# Patient Record
Sex: FEMALE | Race: BLACK OR AFRICAN AMERICAN | NOT HISPANIC OR LATINO | Employment: FULL TIME | URBAN - METROPOLITAN AREA
[De-identification: names, ages, dates, MRNs, and addresses within clinical notes are randomized per-mention and may not be internally consistent; named-entity substitution may affect disease eponyms.]

---

## 2023-03-12 ENCOUNTER — HOSPITAL ENCOUNTER (EMERGENCY)
Facility: HOSPITAL | Age: 18
Discharge: HOME | End: 2023-03-12
Attending: FAMILY MEDICINE
Payer: COMMERCIAL

## 2023-03-12 VITALS
HEART RATE: 77 BPM | DIASTOLIC BLOOD PRESSURE: 100 MMHG | OXYGEN SATURATION: 97 % | BODY MASS INDEX: 48.82 KG/M2 | SYSTOLIC BLOOD PRESSURE: 147 MMHG | TEMPERATURE: 98 F | HEIGHT: 65 IN | RESPIRATION RATE: 18 BRPM | WEIGHT: 293 LBS

## 2023-03-12 DIAGNOSIS — S00.11XA CONTUSION OF RIGHT EYEBROW, INITIAL ENCOUNTER: Primary | ICD-10-CM

## 2023-03-12 LAB
B-HCG UR QL: NEGATIVE
CTP QC/QA: YES

## 2023-03-12 PROCEDURE — 99283 EMERGENCY DEPT VISIT LOW MDM: CPT

## 2023-03-12 PROCEDURE — 81025 URINE PREGNANCY TEST: CPT | Performed by: NURSE PRACTITIONER

## 2023-03-12 RX ORDER — DICLOFENAC SODIUM 75 MG/1
75 TABLET, DELAYED RELEASE ORAL 2 TIMES DAILY
Qty: 14 TABLET | Refills: 0 | Status: SHIPPED | OUTPATIENT
Start: 2023-03-12 | End: 2023-03-19

## 2023-03-12 RX ORDER — TETRACAINE HYDROCHLORIDE 5 MG/ML
1 SOLUTION OPHTHALMIC
Status: DISCONTINUED | OUTPATIENT
Start: 2023-03-12 | End: 2023-03-12

## 2023-03-12 NOTE — ED PROVIDER NOTES
Encounter Date: 3/12/2023       History     Chief Complaint   Patient presents with    Eye Injury     Pt hit above and below R eye with van lift at work at [employer], bruising noted to area, no LOC     Pt is a 18 y.o. female who presents to the [facility] ED for evaluation after being struck on the Rt eyebrow with the edge of Van lift. Reports operating the lift while at work and status it fell, striking her face. Denies pain to Rt eye, change in vision to Rt eye, LOC, dizziness, chest pain, SOB, abdominal pain, or loss of bowel or bladder control.    Review of patient's allergies indicates:  No Known Allergies  History reviewed. No pertinent past medical history.  History reviewed. No pertinent surgical history.  History reviewed. No pertinent family history.  Social History     Tobacco Use    Smoking status: Never    Smokeless tobacco: Never   Substance Use Topics    Alcohol use: Never    Drug use: Never     Review of Systems   Constitutional:  Negative for chills, diaphoresis, fatigue and fever.   HENT:  Positive for facial swelling. Negative for rhinorrhea, sinus pressure, sinus pain, sore throat and trouble swallowing.    Eyes:  Negative for photophobia, pain, discharge and visual disturbance.   Respiratory:  Negative for cough, chest tightness, shortness of breath and wheezing.    Cardiovascular:  Negative for chest pain, palpitations and leg swelling.   Gastrointestinal:  Negative for abdominal pain, diarrhea, nausea and vomiting.   Genitourinary:  Negative for dysuria, flank pain, frequency, hematuria and urgency.   Musculoskeletal:  Negative for arthralgias, back pain, joint swelling and myalgias.   Skin:  Negative for color change and rash.   Neurological:  Negative for dizziness, syncope, weakness and light-headedness.   Hematological:  Does not bruise/bleed easily.   All other systems reviewed and are negative.    Physical Exam     Initial Vitals [03/12/23 1712]   BP Pulse Resp Temp SpO2   (!)  147/100 77 18 98.4 °F (36.9 °C) 97 %      MAP       --         Physical Exam    Nursing note and vitals reviewed.  Constitutional: She appears well-developed and well-nourished.   HENT:   Head: Normocephalic. Head is with contusion. Head is without raccoon's eyes and without Rowe's sign.       Nose: Nose normal.   Mouth/Throat: Oropharynx is clear and moist.   Eyes: Conjunctivae, EOM and lids are normal. Pupils are equal, round, and reactive to light.   Neck: Neck supple.   Normal range of motion.  Cardiovascular:  Normal rate, regular rhythm, normal heart sounds and intact distal pulses.           Pulmonary/Chest: Effort normal and breath sounds normal. No respiratory distress. She has no wheezes. She has no rhonchi. She has no rales. She exhibits no tenderness.   Abdominal: Abdomen is soft and flat. Bowel sounds are normal. She exhibits no distension. There is no abdominal tenderness. There is no rebound, no guarding, no tenderness at McBurney's point and negative Mcelroy's sign. negative psoas sign  Musculoskeletal:         General: Normal range of motion.      Cervical back: Normal range of motion and neck supple.     Neurological: She is alert and oriented to person, place, and time. She has normal strength and normal reflexes.   Skin: Skin is warm and dry. Capillary refill takes less than 2 seconds.   Psychiatric: She has a normal mood and affect. Her speech is normal and behavior is normal. Judgment and thought content normal.       ED Course   Procedures  Labs Reviewed   POCT URINE PREGNANCY          Imaging Results    None          Medications - No data to display  Medical Decision Making:   Differential Diagnosis:   Facial contusion  Clinical Tests:   Lab Tests: Ordered and Reviewed  ED Management:  5:47 PM Reassessed patient at this time. Reports condition has improved. Discussed with patient all pertinent ED information and results. Discussed diagnosis and treatment plan with patient. Follow up  instructions and return to ED instruction have been given. All questions and concerns were addressed at this time. Patient voices understanding of information and instructions. Patient is comfortable with plan and discharge. Patient is stable for discharge.                           Clinical Impression:   Final diagnoses:  [S00.11XA] Contusion of right eyebrow, initial encounter (Primary)        ED Disposition Condition    Discharge Stable          ED Prescriptions       Medication Sig Dispense Start Date End Date Auth. Provider    diclofenac (VOLTAREN) 75 MG EC tablet Take 1 tablet (75 mg total) by mouth 2 (two) times daily. for 7 days 14 tablet 3/12/2023 3/19/2023 Aman Sanchez Jr., MONIKA          Follow-up Information    None          Aman Sanchez Jr., MONIKA  03/12/23 8160

## 2023-03-12 NOTE — DISCHARGE INSTRUCTIONS
Head injury precautions for next 12 hours. No alcohol, no NSAIDs, tylenol only for pain in that time frame.  Follow up with your PCP in next 5-7 days for evaluation.   Return to the [facility] ED immediately for change in mentation, bleeding from ears/nose, or loss of bowel or bladder control.   Take medication as prescribed.

## 2023-03-12 NOTE — Clinical Note
"Amelia "BillOmara" Filiberto was seen and treated in our emergency department on 3/12/2023.  She may return to work on 03/15/2023.       If you have any questions or concerns, please don't hesitate to call.      Aman Sanchez Jr., FNP"

## 2024-05-01 ENCOUNTER — HOSPITAL ENCOUNTER (EMERGENCY)
Facility: HOSPITAL | Age: 19
Discharge: HOME | End: 2024-05-01
Attending: INTERNAL MEDICINE

## 2024-05-01 VITALS
WEIGHT: 292.69 LBS | OXYGEN SATURATION: 100 % | HEIGHT: 64 IN | BODY MASS INDEX: 49.97 KG/M2 | TEMPERATURE: 98 F | DIASTOLIC BLOOD PRESSURE: 86 MMHG | RESPIRATION RATE: 18 BRPM | HEART RATE: 58 BPM | SYSTOLIC BLOOD PRESSURE: 135 MMHG

## 2024-05-01 DIAGNOSIS — N20.1 URETEROLITHIASIS: Primary | ICD-10-CM

## 2024-05-01 DIAGNOSIS — N12 PYELONEPHRITIS: ICD-10-CM

## 2024-05-01 LAB
ALBUMIN SERPL-MCNC: 3.8 G/DL (ref 3.5–5)
ALBUMIN/GLOB SERPL: 0.9 RATIO (ref 1.1–2)
ALP SERPL-CCNC: 92 UNIT/L (ref 40–150)
ALT SERPL-CCNC: 15 UNIT/L (ref 0–55)
APPEARANCE UR: ABNORMAL
AST SERPL-CCNC: 17 UNIT/L (ref 5–34)
B-HCG UR QL: NEGATIVE
BACTERIA #/AREA URNS AUTO: ABNORMAL /HPF
BASOPHILS # BLD AUTO: 0.05 X10(3)/MCL
BASOPHILS NFR BLD AUTO: 0.5 %
BILIRUB SERPL-MCNC: 0.3 MG/DL
BILIRUB UR QL STRIP.AUTO: NEGATIVE
BUN SERPL-MCNC: 15.3 MG/DL (ref 7–18.7)
CALCIUM SERPL-MCNC: 9.7 MG/DL (ref 8.4–10.2)
CHLORIDE SERPL-SCNC: 108 MMOL/L (ref 98–107)
CO2 SERPL-SCNC: 22 MMOL/L (ref 22–29)
COLOR UR AUTO: YELLOW
CREAT SERPL-MCNC: 0.93 MG/DL (ref 0.55–1.02)
CTP QC/QA: YES
EOSINOPHIL # BLD AUTO: 0.1 X10(3)/MCL (ref 0–0.9)
EOSINOPHIL NFR BLD AUTO: 1 %
ERYTHROCYTE [DISTWIDTH] IN BLOOD BY AUTOMATED COUNT: 13.8 % (ref 11.5–17)
GFR SERPLBLD CREATININE-BSD FMLA CKD-EPI: >60 MLS/MIN/1.73/M2
GLOBULIN SER-MCNC: 4.1 GM/DL (ref 2.4–3.5)
GLUCOSE SERPL-MCNC: 83 MG/DL (ref 74–100)
GLUCOSE UR QL STRIP.AUTO: NORMAL
HCT VFR BLD AUTO: 40.4 % (ref 37–47)
HGB BLD-MCNC: 12.8 G/DL (ref 12–16)
HOLD SPECIMEN: NORMAL
HYALINE CASTS #/AREA URNS LPF: ABNORMAL /LPF
IMM GRANULOCYTES # BLD AUTO: 0.03 X10(3)/MCL (ref 0–0.04)
IMM GRANULOCYTES NFR BLD AUTO: 0.3 %
KETONES UR QL STRIP.AUTO: NEGATIVE
LEUKOCYTE ESTERASE UR QL STRIP.AUTO: 75
LYMPHOCYTES # BLD AUTO: 2.71 X10(3)/MCL (ref 0.6–4.6)
LYMPHOCYTES NFR BLD AUTO: 27.9 %
MCH RBC QN AUTO: 26.2 PG (ref 27–31)
MCHC RBC AUTO-ENTMCNC: 31.7 G/DL (ref 33–36)
MCV RBC AUTO: 82.8 FL (ref 80–94)
MONOCYTES # BLD AUTO: 0.45 X10(3)/MCL (ref 0.1–1.3)
MONOCYTES NFR BLD AUTO: 4.6 %
MUCOUS THREADS URNS QL MICRO: ABNORMAL /LPF
NEUTROPHILS # BLD AUTO: 6.36 X10(3)/MCL (ref 2.1–9.2)
NEUTROPHILS NFR BLD AUTO: 65.7 %
NITRITE UR QL STRIP.AUTO: NEGATIVE
NRBC BLD AUTO-RTO: 0 %
PH UR STRIP.AUTO: 6.5 [PH]
PLATELET # BLD AUTO: 414 X10(3)/MCL (ref 130–400)
PMV BLD AUTO: 11.8 FL (ref 7.4–10.4)
POTASSIUM SERPL-SCNC: 4.3 MMOL/L (ref 3.5–5.1)
PROT SERPL-MCNC: 7.9 GM/DL (ref 6.4–8.3)
PROT UR QL STRIP.AUTO: ABNORMAL
RBC # BLD AUTO: 4.88 X10(6)/MCL (ref 4.2–5.4)
RBC #/AREA URNS AUTO: >100 /HPF
RBC UR QL AUTO: ABNORMAL
SODIUM SERPL-SCNC: 140 MMOL/L (ref 136–145)
SP GR UR STRIP.AUTO: 1.02 (ref 1–1.03)
SQUAMOUS #/AREA URNS LPF: ABNORMAL /HPF
UROBILINOGEN UR STRIP-ACNC: NORMAL
WBC # SPEC AUTO: 9.7 X10(3)/MCL (ref 4.5–11.5)
WBC #/AREA URNS AUTO: ABNORMAL /HPF

## 2024-05-01 PROCEDURE — 25000003 PHARM REV CODE 250: Performed by: PHYSICIAN ASSISTANT

## 2024-05-01 PROCEDURE — 96376 TX/PRO/DX INJ SAME DRUG ADON: CPT

## 2024-05-01 PROCEDURE — 81025 URINE PREGNANCY TEST: CPT | Performed by: PHYSICIAN ASSISTANT

## 2024-05-01 PROCEDURE — 96374 THER/PROPH/DIAG INJ IV PUSH: CPT

## 2024-05-01 PROCEDURE — 81001 URINALYSIS AUTO W/SCOPE: CPT | Performed by: PHYSICIAN ASSISTANT

## 2024-05-01 PROCEDURE — 63600175 PHARM REV CODE 636 W HCPCS: Performed by: PHYSICIAN ASSISTANT

## 2024-05-01 PROCEDURE — 87086 URINE CULTURE/COLONY COUNT: CPT | Performed by: PHYSICIAN ASSISTANT

## 2024-05-01 PROCEDURE — 80053 COMPREHEN METABOLIC PANEL: CPT | Performed by: PHYSICIAN ASSISTANT

## 2024-05-01 PROCEDURE — 85025 COMPLETE CBC W/AUTO DIFF WBC: CPT | Performed by: PHYSICIAN ASSISTANT

## 2024-05-01 PROCEDURE — 99285 EMERGENCY DEPT VISIT HI MDM: CPT | Mod: 25

## 2024-05-01 PROCEDURE — 96361 HYDRATE IV INFUSION ADD-ON: CPT

## 2024-05-01 RX ORDER — KETOROLAC TROMETHAMINE 10 MG/1
10 TABLET, FILM COATED ORAL EVERY 6 HOURS PRN
Qty: 20 TABLET | Refills: 0 | Status: SHIPPED | OUTPATIENT
Start: 2024-05-01 | End: 2024-05-06

## 2024-05-01 RX ORDER — CEFUROXIME AXETIL 500 MG/1
500 TABLET ORAL EVERY 12 HOURS
Qty: 14 TABLET | Refills: 0 | Status: SHIPPED | OUTPATIENT
Start: 2024-05-01 | End: 2024-05-08

## 2024-05-01 RX ORDER — KETOROLAC TROMETHAMINE 30 MG/ML
15 INJECTION, SOLUTION INTRAMUSCULAR; INTRAVENOUS
Status: COMPLETED | OUTPATIENT
Start: 2024-05-01 | End: 2024-05-01

## 2024-05-01 RX ORDER — TAMSULOSIN HYDROCHLORIDE 0.4 MG/1
0.4 CAPSULE ORAL
Status: COMPLETED | OUTPATIENT
Start: 2024-05-01 | End: 2024-05-01

## 2024-05-01 RX ORDER — ONDANSETRON 4 MG/1
4 TABLET, ORALLY DISINTEGRATING ORAL EVERY 8 HOURS PRN
Qty: 20 TABLET | Refills: 0 | Status: SHIPPED | OUTPATIENT
Start: 2024-05-01

## 2024-05-01 RX ORDER — TAMSULOSIN HYDROCHLORIDE 0.4 MG/1
0.4 CAPSULE ORAL DAILY
Qty: 14 CAPSULE | Refills: 0 | Status: SHIPPED | OUTPATIENT
Start: 2024-05-01 | End: 2024-05-15

## 2024-05-01 RX ADMIN — KETOROLAC TROMETHAMINE 15 MG: 30 INJECTION, SOLUTION INTRAMUSCULAR; INTRAVENOUS at 02:05

## 2024-05-01 RX ADMIN — SODIUM CHLORIDE 1000 ML: 9 INJECTION, SOLUTION INTRAVENOUS at 12:05

## 2024-05-01 RX ADMIN — KETOROLAC TROMETHAMINE 15 MG: 30 INJECTION, SOLUTION INTRAMUSCULAR at 12:05

## 2024-05-01 RX ADMIN — TAMSULOSIN HYDROCHLORIDE 0.4 MG: 0.4 CAPSULE ORAL at 02:05

## 2024-05-01 NOTE — ED PROVIDER NOTES
Encounter Date: 5/1/2024       History     Chief Complaint   Patient presents with    Abdominal Pain     Pt c/o jed lower abdominal/pelvic/vaginal pain x 3 days.       Bill De Los Santos is a 19 y.o. female with  no known medical problems who presents to the ED complaining of right flank pain x 3 days. Reports today pain started to radiate to her right groin/pelvic region. This morning she felt nauseated and vomited twice. Also noticed hematuria while at work today. She denies fevers, chills, chest pain, SOB, dysuria, vaginal discharge. Mother has history of kidney stones.     The history is provided by the patient and a parent.     Review of patient's allergies indicates:  No Known Allergies  No past medical history on file.  No past surgical history on file.  No family history on file.  Social History     Tobacco Use    Smoking status: Never    Smokeless tobacco: Never   Substance Use Topics    Alcohol use: Never    Drug use: Never     Review of Systems   Constitutional:  Negative for chills and fever.   HENT:  Negative for congestion and sore throat.    Respiratory:  Negative for cough and shortness of breath.    Cardiovascular:  Negative for chest pain and leg swelling.   Gastrointestinal:  Positive for abdominal pain. Negative for nausea.   Genitourinary:  Positive for flank pain and hematuria. Negative for dysuria.   Musculoskeletal:  Negative for arthralgias and back pain.   Skin:  Negative for rash and wound.   Neurological:  Negative for dizziness and weakness.   Hematological:  Does not bruise/bleed easily.       Physical Exam     Initial Vitals [05/01/24 1107]   BP Pulse Resp Temp SpO2   (!) 135/96 63 18 98.2 °F (36.8 °C) 99 %      MAP       --         Physical Exam    Nursing note and vitals reviewed.  Constitutional: She appears well-developed and well-nourished. No distress.   HENT:   Head: Normocephalic and atraumatic.   Mouth/Throat: No oropharyngeal exudate.   Eyes: EOM are normal. No scleral  icterus.   Neck: Neck supple.   Normal range of motion.  Cardiovascular:  Normal rate and regular rhythm.           No murmur heard.  Pulmonary/Chest: Breath sounds normal. No respiratory distress. She has no wheezes.   Abdominal: Abdomen is soft. Bowel sounds are normal. She exhibits no distension. There is no abdominal tenderness.   There is right CVA tenderness.  No left CVA tenderness. There is no rebound and no guarding.   Musculoskeletal:         General: No tenderness. Normal range of motion.      Cervical back: Normal range of motion and neck supple.     Neurological: She is alert and oriented to person, place, and time. No cranial nerve deficit.   Skin: Skin is warm and dry. Capillary refill takes less than 2 seconds. No erythema.   Psychiatric: She has a normal mood and affect. Her behavior is normal. Judgment and thought content normal.         ED Course   Procedures  Labs Reviewed   URINALYSIS, REFLEX TO URINE CULTURE - Abnormal; Notable for the following components:       Result Value    Appearance, UA Turbid (*)     Protein, UA Trace (*)     Blood, UA 3+ (*)     Leukocyte Esterase, UA 75 (*)     WBC, UA 21-50 (*)     Mucous, UA Trace (*)     RBC, UA >100 (*)     All other components within normal limits   COMPREHENSIVE METABOLIC PANEL - Abnormal; Notable for the following components:    Chloride 108 (*)     Globulin 4.1 (*)     Albumin/Globulin Ratio 0.9 (*)     All other components within normal limits   CBC WITH DIFFERENTIAL - Abnormal; Notable for the following components:    MCH 26.2 (*)     MCHC 31.7 (*)     Platelet 414 (*)     MPV 11.8 (*)     All other components within normal limits   CULTURE, URINE   CBC W/ AUTO DIFFERENTIAL    Narrative:     The following orders were created for panel order CBC auto differential.  Procedure                               Abnormality         Status                     ---------                               -----------         ------                     CBC  with Differential[[id number]]       Abnormal            Final result                 Please view results for these tests on the individual orders.   EXTRA TUBES    Narrative:     The following orders were created for panel order EXTRA TUBES.  Procedure                               Abnormality         Status                     ---------                               -----------         ------                     Light Blue Top Hold[[id number]]                             Final result               Red Top Hold[[id number]]                                    Final result               Gold Top Hold[[id number]]                                   Final result                 Please view results for these tests on the individual orders.   LIGHT BLUE TOP HOLD   RED TOP HOLD   GOLD TOP HOLD   POCT URINE PREGNANCY          Imaging Results              CT Abdomen Pelvis  Without Contrast (Final result)  Result time 05/01/24 14:10:26      Final result by Wil Armstrong MD (05/01/24 14:10:26)                   Impression:      1. Right distal ureteral calculus causing mild hydroureteronephrosis.    2. Bilateral kidneys multiple non occluding calculi.      Electronically signed by: Wil Armstrong  Date:    05/01/2024  Time:    14:10               Narrative:    EXAMINATION:  CT ABDOMEN PELVIS WITHOUT CONTRAST    CLINICAL HISTORY:  Flank pain, kidney stone suspected;    TECHNIQUE:  Multidetector axial images were obtained from the  diaphragms to below symphysis pubis without the administration of IV contrast. Oral contrast was not administered.    Dose length product of 1584 mGycm. Automated exposure control was utilized to minimize radiation dose.    COMPARISON:  None available.    FINDINGS:  Included lungs are without suspicious nodularity, acute air space infiltrates or fluid within the pleural spaces.    Within limitations of noncontrast technique, no acute findings of the liver, pancreas and spleen identified. Gallbladder  wall is not thickened and there is no intraluminal calcified calculus. No apparent biliary dilation.    The adrenal glands noncontrast evaluation is unremarkable. The kidneys are unremarkable in size and contour.  There is mild dilatation of the right kidney collecting system and the ureter which is caused by distal ureteral 4.5 mm calculus which is seen on image 144 series 5.  This calculus is approximately 9 cm proximal from the ureterovesical junction.  Bilateral kidneys are remarkable for multiple non occluding calculi.  No acute left obstructive uropathy.  There are no significant perinephric standings..    The stomach is partially decompressed.  Small bowel is normal in caliber. There is no free air or free fluid. The appendix is not dilated. Pericolonic fat is preserved without acute inflammatory strandings. There is no evidence for bowel obstruction.    Urinary bladder appears within normal limits. No intravesical stone identified. There is no pelvic free fluid.  There are bilateral inguinal few nonspecific mildly enlarged lymph nodes.                                       Medications   tamsulosin 24 hr capsule 0.4 mg (has no administration in time range)   ketorolac injection 15 mg (has no administration in time range)   sodium chloride 0.9% bolus 1,000 mL 1,000 mL (0 mLs Intravenous Stopped 5/1/24 1332)   ketorolac injection 15 mg (15 mg Intravenous Given 5/1/24 1206)     Medical Decision Making  Differential: pyelonephritis, nephrolithiasis, ureterolithiasis, among others    ED management: HDS. +R CVA tenderness. UA with 75 leukocytes, 21-50 WBC. No leukocytosis, afebrile. Cr WNL. CT abdomen/pelvis with 4.5 mm stone in distal ureter causing mild hydro, non-obstructing. Pt symptoms improved since receiving toradol and IVF. She feels comfortable being discharged. Discussed with Dr. Campbell who is in agreement. Will treat with flomax x 2 weeks, cefuroxime BID x 7 days. Toradol and zofran prn. Instructed patient  to strain her urine. Will refer to urology for further evaluation. Strict ED return precautions discussed with both the patient and her mother. They verbalized understanding. All questions answered.     Amount and/or Complexity of Data Reviewed  Labs: ordered. Decision-making details documented in ED Course.  Radiology: ordered. Decision-making details documented in ED Course.    Risk  Prescription drug management.               ED Course as of 05/01/24 1429   Wed May 01, 2024   1149 RBC, UA(!): >100 [KD]   1336 Delay in CT [KD]   1415 CT Abdomen Pelvis  Without Contrast  1. Right distal ureteral calculus causing mild hydroureteronephrosis.     2. Bilateral kidneys multiple non occluding calculi. [KD]      ED Course User Index  [KD] Archana Gaffney PA-C                             Clinical Impression:  Final diagnoses:  [N20.1] Ureterolithiasis (Primary)  [N12] Pyelonephritis          ED Disposition Condition    Discharge Stable          ED Prescriptions       Medication Sig Dispense Start Date End Date Auth. Provider    tamsulosin (FLOMAX) 0.4 mg Cap Take 1 capsule (0.4 mg total) by mouth once daily. for 14 days 14 capsule 5/1/2024 5/15/2024 Archana Gaffney PA-C    cefUROXime (CEFTIN) 500 MG tablet Take 1 tablet (500 mg total) by mouth every 12 (twelve) hours. for 7 days 14 tablet 5/1/2024 5/8/2024 Archana Gaffney PA-C    ketorolac (TORADOL) 10 mg tablet Take 1 tablet (10 mg total) by mouth every 6 (six) hours as needed for Pain. 20 tablet 5/1/2024 5/6/2024 Archana Gaffney PA-C    ondansetron (ZOFRAN-ODT) 4 MG TbDL Take 1 tablet (4 mg total) by mouth every 8 (eight) hours as needed (nausea/vomiting). 20 tablet 5/1/2024 -- Archana Gaffney PA-C          Follow-up Information       Follow up With Specialties Details Why Contact Info    [facility] Emergency Medicine  If symptoms worsen [address] [facility] [address] [phone]    PCP  In 3  days Hospital follow up     [facility] Urology Schedule an appointment as soon as possible for a visit   [address] [facility] [address] [phone]             Archana Gaffney PA-C  05/01/24 1430

## 2024-05-01 NOTE — Clinical Note
"Bill Lynn "Bill De Los Santos was seen and treated in our emergency department on 5/1/2024.  She may return to work on 05/05/2024.       If you have any questions or concerns, please don't hesitate to call.      Archana Gaffney PA-C"

## 2024-05-03 LAB — BACTERIA UR CULT: NORMAL

## 2025-06-10 ENCOUNTER — HOSPITAL ENCOUNTER (EMERGENCY)
Facility: HOSPITAL | Age: 20
Discharge: HOME | End: 2025-06-10
Attending: EMERGENCY MEDICINE

## 2025-06-10 VITALS
BODY MASS INDEX: 47.31 KG/M2 | HEART RATE: 64 BPM | HEIGHT: 65 IN | TEMPERATURE: 98 F | WEIGHT: 283.94 LBS | RESPIRATION RATE: 18 BRPM | SYSTOLIC BLOOD PRESSURE: 146 MMHG | OXYGEN SATURATION: 99 % | DIASTOLIC BLOOD PRESSURE: 84 MMHG

## 2025-06-10 DIAGNOSIS — R10.9 LEFT FLANK PAIN: Primary | ICD-10-CM

## 2025-06-10 DIAGNOSIS — N20.1 URETEROLITHIASIS: ICD-10-CM

## 2025-06-10 LAB
ALBUMIN SERPL-MCNC: 3.7 G/DL (ref 3.5–5)
ALBUMIN/GLOB SERPL: 0.9 RATIO (ref 1.1–2)
ALP SERPL-CCNC: 85 UNIT/L (ref 40–150)
ALT SERPL-CCNC: 12 UNIT/L (ref 0–55)
ANION GAP SERPL CALC-SCNC: 7 MEQ/L
AST SERPL-CCNC: 12 UNIT/L (ref 11–45)
B-HCG UR QL: NEGATIVE
BACTERIA #/AREA URNS AUTO: ABNORMAL /HPF
BASOPHILS # BLD AUTO: 0.06 X10(3)/MCL
BASOPHILS NFR BLD AUTO: 0.4 %
BILIRUB SERPL-MCNC: 0.4 MG/DL
BILIRUB UR QL STRIP.AUTO: NEGATIVE
BUN SERPL-MCNC: 16.4 MG/DL (ref 7–18.7)
CALCIUM SERPL-MCNC: 9.4 MG/DL (ref 8.4–10.2)
CHLORIDE SERPL-SCNC: 110 MMOL/L (ref 98–107)
CLARITY UR: ABNORMAL
CO2 SERPL-SCNC: 22 MMOL/L (ref 22–29)
COLOR UR AUTO: ABNORMAL
CREAT SERPL-MCNC: 1 MG/DL (ref 0.55–1.02)
CREAT/UREA NIT SERPL: 16
CTP QC/QA: YES
EOSINOPHIL # BLD AUTO: 0.07 X10(3)/MCL (ref 0–0.9)
EOSINOPHIL NFR BLD AUTO: 0.5 %
ERYTHROCYTE [DISTWIDTH] IN BLOOD BY AUTOMATED COUNT: 13 % (ref 11.5–17)
GFR SERPLBLD CREATININE-BSD FMLA CKD-EPI: >60 ML/MIN/1.73/M2
GLOBULIN SER-MCNC: 4 GM/DL (ref 2.4–3.5)
GLUCOSE SERPL-MCNC: 117 MG/DL (ref 74–100)
GLUCOSE UR QL STRIP: NORMAL
HCT VFR BLD AUTO: 40.8 % (ref 37–47)
HGB BLD-MCNC: 12.9 G/DL (ref 12–16)
HGB UR QL STRIP: ABNORMAL
HOLD SPECIMEN: NORMAL
HYALINE CASTS #/AREA URNS LPF: ABNORMAL /LPF
IMM GRANULOCYTES # BLD AUTO: 0.04 X10(3)/MCL (ref 0–0.04)
IMM GRANULOCYTES NFR BLD AUTO: 0.3 %
KETONES UR QL STRIP: NEGATIVE
LEUKOCYTE ESTERASE UR QL STRIP: 75
LIPASE SERPL-CCNC: 13 U/L
LYMPHOCYTES # BLD AUTO: 1.95 X10(3)/MCL (ref 0.6–4.6)
LYMPHOCYTES NFR BLD AUTO: 14.3 %
MAGNESIUM SERPL-MCNC: 2 MG/DL (ref 1.6–2.6)
MCH RBC QN AUTO: 26.7 PG (ref 27–31)
MCHC RBC AUTO-ENTMCNC: 31.6 G/DL (ref 33–36)
MCV RBC AUTO: 84.3 FL (ref 80–94)
MONOCYTES # BLD AUTO: 0.53 X10(3)/MCL (ref 0.1–1.3)
MONOCYTES NFR BLD AUTO: 3.9 %
MUCOUS THREADS URNS QL MICRO: ABNORMAL /LPF
NEUTROPHILS # BLD AUTO: 11 X10(3)/MCL (ref 2.1–9.2)
NEUTROPHILS NFR BLD AUTO: 80.6 %
NITRITE UR QL STRIP: NEGATIVE
NRBC BLD AUTO-RTO: 0 %
PH UR STRIP: 6 [PH]
PLATELET # BLD AUTO: 389 X10(3)/MCL (ref 130–400)
PMV BLD AUTO: 10.7 FL (ref 7.4–10.4)
POTASSIUM SERPL-SCNC: 4.2 MMOL/L (ref 3.5–5.1)
PROT SERPL-MCNC: 7.7 GM/DL (ref 6.4–8.3)
PROT UR QL STRIP: ABNORMAL
RBC # BLD AUTO: 4.84 X10(6)/MCL (ref 4.2–5.4)
RBC #/AREA URNS AUTO: >100 /HPF
SODIUM SERPL-SCNC: 139 MMOL/L (ref 136–145)
SP GR UR STRIP.AUTO: 1.03 (ref 1–1.03)
SQUAMOUS #/AREA URNS LPF: ABNORMAL /HPF
UROBILINOGEN UR STRIP-ACNC: NORMAL
WBC # BLD AUTO: 13.65 X10(3)/MCL (ref 4.5–11.5)
WBC #/AREA URNS AUTO: ABNORMAL /HPF

## 2025-06-10 PROCEDURE — 83690 ASSAY OF LIPASE: CPT | Performed by: EMERGENCY MEDICINE

## 2025-06-10 PROCEDURE — 83735 ASSAY OF MAGNESIUM: CPT | Performed by: EMERGENCY MEDICINE

## 2025-06-10 PROCEDURE — 96374 THER/PROPH/DIAG INJ IV PUSH: CPT

## 2025-06-10 PROCEDURE — 63600175 PHARM REV CODE 636 W HCPCS: Mod: JZ,TB | Performed by: EMERGENCY MEDICINE

## 2025-06-10 PROCEDURE — 81015 MICROSCOPIC EXAM OF URINE: CPT | Performed by: EMERGENCY MEDICINE

## 2025-06-10 PROCEDURE — 85025 COMPLETE CBC W/AUTO DIFF WBC: CPT | Performed by: EMERGENCY MEDICINE

## 2025-06-10 PROCEDURE — 99285 EMERGENCY DEPT VISIT HI MDM: CPT | Mod: 25

## 2025-06-10 PROCEDURE — 96375 TX/PRO/DX INJ NEW DRUG ADDON: CPT

## 2025-06-10 PROCEDURE — 81025 URINE PREGNANCY TEST: CPT | Performed by: EMERGENCY MEDICINE

## 2025-06-10 PROCEDURE — 87086 URINE CULTURE/COLONY COUNT: CPT | Performed by: EMERGENCY MEDICINE

## 2025-06-10 PROCEDURE — 80053 COMPREHEN METABOLIC PANEL: CPT | Performed by: EMERGENCY MEDICINE

## 2025-06-10 RX ORDER — KETOROLAC TROMETHAMINE 10 MG/1
10 TABLET, FILM COATED ORAL EVERY 6 HOURS PRN
Qty: 20 TABLET | Refills: 0 | Status: SHIPPED | OUTPATIENT
Start: 2025-06-10 | End: 2025-06-15

## 2025-06-10 RX ORDER — ONDANSETRON HYDROCHLORIDE 2 MG/ML
4 INJECTION, SOLUTION INTRAVENOUS
Status: COMPLETED | OUTPATIENT
Start: 2025-06-10 | End: 2025-06-10

## 2025-06-10 RX ORDER — KETOROLAC TROMETHAMINE 30 MG/ML
15 INJECTION, SOLUTION INTRAMUSCULAR; INTRAVENOUS
Status: COMPLETED | OUTPATIENT
Start: 2025-06-10 | End: 2025-06-10

## 2025-06-10 RX ORDER — ONDANSETRON 4 MG/1
4 TABLET, ORALLY DISINTEGRATING ORAL EVERY 6 HOURS PRN
Qty: 20 TABLET | Refills: 0 | Status: SHIPPED | OUTPATIENT
Start: 2025-06-10

## 2025-06-10 RX ADMIN — KETOROLAC TROMETHAMINE 15 MG: 30 INJECTION, SOLUTION INTRAMUSCULAR at 10:06

## 2025-06-10 RX ADMIN — ONDANSETRON 4 MG: 2 INJECTION INTRAMUSCULAR; INTRAVENOUS at 10:06

## 2025-06-10 NOTE — DISCHARGE INSTRUCTIONS
Please use pain and nausea medications as prescribed.    A urology referral has been placed in our electronic medical record system.  You should receive a call within the next week to schedule 1st available appointment to establish urology care and re-evaluation.    There was no overwhelming evidence of any infection in urine today however if you begin to experience fevers to find his greater than 100.4 F for more than an hour with chills please seek medical evaluation promptly as an infected still he gets you very sick very quickly.

## 2025-06-10 NOTE — ED PROVIDER NOTES
Encounter Date: 6/10/2025       History     Chief Complaint   Patient presents with    Flank Pain     Pt in with complaints of left sided flank pain that started at about 6:00 am.     Patient with a history kidney stones presents with sudden onset of left-sided sharp intermittent flank pain.  She also has noticed blood in her urine as she is not on her menstrual cycle.  Denies any vaginal discharge or concerns for vaginal infection or dysuria.  No fevers or chills.  She has had some nausea associated with the discomfort in her left flank but no vomiting.      Review of patient's allergies indicates:  No Known Allergies  History reviewed. No pertinent past medical history.  History reviewed. No pertinent surgical history.  No family history on file.  Social History[1]  Review of Systems   Musculoskeletal:         Sharp left flank pain   All other systems reviewed and are negative.      Physical Exam     Initial Vitals [06/10/25 0956]   BP Pulse Resp Temp SpO2   (!) 146/95 61 14 98.1 °F (36.7 °C) 100 %      MAP       --         Physical Exam    Nursing note and vitals reviewed.  Constitutional: She appears well-developed and well-nourished. No distress.   HENT:   Head: Normocephalic and atraumatic.   Eyes: EOM are normal. Pupils are equal, round, and reactive to light.   Neck:   Normal range of motion.  Cardiovascular:  Normal rate, regular rhythm and normal heart sounds.           No murmur heard.  Pulmonary/Chest: Breath sounds normal. No stridor. No respiratory distress. She has no wheezes.   Abdominal: Abdomen is soft. Bowel sounds are normal. She exhibits no distension.   Musculoskeletal:         General: Normal range of motion.      Cervical back: Normal range of motion.      Comments: No CVA tenderness bilaterally     Psychiatric: She has a normal mood and affect.         ED Course   Procedures  Labs Reviewed   URINALYSIS, REFLEX TO URINE CULTURE - Abnormal       Result Value    Color, UA Pink-red (*)      Appearance, UA Extra Turbid (*)     Specific Gravity, UA 1.027      pH, UA 6.0      Protein, UA Trace (*)     Glucose, UA Normal      Ketones, UA Negative      Blood, UA 3+ (*)     Bilirubin, UA Negative      Urobilinogen, UA Normal      Nitrites, UA Negative      Leukocyte Esterase, UA 75 (*)     RBC, UA >100 (*)     WBC, UA 21-50 (*)     Bacteria, UA None Seen      Squamous Epithelial Cells, UA Trace (*)     Mucous, UA Trace (*)     Hyaline Casts, UA None Seen     COMPREHENSIVE METABOLIC PANEL - Abnormal    Sodium 139      Potassium 4.2      Chloride 110 (*)     CO2 22      Glucose 117 (*)     Blood Urea Nitrogen 16.4      Creatinine 1.00      Calcium 9.4      Protein Total 7.7      Albumin 3.7      Globulin 4.0 (*)     Albumin/Globulin Ratio 0.9 (*)     Bilirubin Total 0.4      ALP 85      ALT 12      AST 12      eGFR >60      Anion Gap 7.0      BUN/Creatinine Ratio 16     CBC WITH DIFFERENTIAL - Abnormal    WBC 13.65 (*)     RBC 4.84      Hgb 12.9      Hct 40.8      MCV 84.3      MCH 26.7 (*)     MCHC 31.6 (*)     RDW 13.0      Platelet 389      MPV 10.7 (*)     Neut % 80.6      Lymph % 14.3      Mono % 3.9      Eos % 0.5      Basophil % 0.4      Imm Grans % 0.3      Neut # 11.00 (*)     Lymph # 1.95      Mono # 0.53      Eos # 0.07      Baso # 0.06      Imm Gran # 0.04      NRBC% 0.0     MAGNESIUM - Normal    Magnesium Level 2.00     LIPASE - Normal    Lipase Level 13     CULTURE, URINE   CBC W/ AUTO DIFFERENTIAL    Narrative:     The following orders were created for panel order CBC auto differential.  Procedure                               Abnormality         Status                     ---------                               -----------         ------                     CBC with Differential[[id number]]       Abnormal            Final result                 Please view results for these tests on the individual orders.   EXTRA TUBES    Narrative:     The following orders were created for panel order EXTRA  TUBES.  Procedure                               Abnormality         Status                     ---------                               -----------         ------                     Light Blue Top Hold[[id number]]                             In process                 Light Green Top Hold[[id number]]                            In process                 Gold Top Hold[[id number]]                                   In process                 Pink Top Hold[[id number]]                                   In process                   Please view results for these tests on the individual orders.   LIGHT BLUE TOP HOLD   LIGHT GREEN TOP HOLD   GOLD TOP HOLD   PINK TOP HOLD   POCT URINE PREGNANCY    POC Preg Test, Ur Negative       Acceptable Yes            Imaging Results              CT Renal Stone Study ABD Pelvis WO (Final result)  Result time 06/10/25 11:10:53      Final result by Florencio Stapleton MD (06/10/25 11:10:53)                   Impression:      A few punctate nonobstructing medullary calculi in both kidneys    Mild left-sided hydronephrosis and hydroureter secondary to a 3.8 x 3 x 3.2 mm stone in the left distal ureter at the UVJ      Electronically signed by: Robby Stapleton  Date:    06/10/2025  Time:    11:10               Narrative:    EXAMINATION:  CT RENAL STONE STUDY ABD PELVIS WO    CLINICAL HISTORY:  L flank pain, hx of stones;    TECHNIQUE:  Low dose axial images, sagittal and coronal reformations were obtained from the lung bases to the pubic symphysis.  No contrast was administered.  Automatic exposure control is utilized to reduce patient radiation exposure.    COMPARISON:  None    FINDINGS:  The lung bases are clear.    The liver appears normal.  No obvious liver mass or lesion is seen.    Gallbladder appears normal.  No gallstones are seen.    The pancreas appears normal.  No inflammatory changes are seen in the pancreatic region.    The spleen appears normal and adrenal glands  appear normal.  No adrenal nodule is seen.    There are few punctate nonobstructing medullary calculi in both kidneys.  No right-sided hydronephrosis or hydroureter seen.  There is minimal left-sided hydronephrosis and hydroureter secondary to a  stone in the left distal ureter at the UVJ.  It measures 3.8 by 3 by 3.2 mm.  It is almost within the urinary bladder..    No colitis is seen.  No diverticulitis is seen.  No appendicitis is seen.    No free air seen.  No free fluid is seen.  The urinary bladder appears normal.    Uterus appears unremarkable.    Bones show no acute abnormality.                                       Medications   ondansetron injection 4 mg (4 mg Intravenous Given 6/10/25 1041)   ketorolac injection 15 mg (15 mg Intravenous Given 6/10/25 1041)     Medical Decision Making  Amount and/or Complexity of Data Reviewed  Labs: ordered.  Radiology: ordered.    Risk  Prescription drug management.                          Medical Decision Making:   Initial Assessment:   Workup in progress  Differential Diagnosis:   Kidney stone, pyelo, UTI, musculoskeletal pain, pancreatitis  ED Management:  Time 11:24 a.m.   Patient's labs within normal limits nonsignificant stable vitals no fever sudden onset of pain on left and patient found to have little over 3 mm left ureter stone at the ureteropelvic junction.  Urinalysis does have a small amount of weights but also has squamous cells in blood.  Sudden onset feel like this is likely reactive with mild leukocytosis with no dysuria and this is not been lingering with sudden onset at 6:00 a.m..  I will provide strict return precautions any fevers chills fevers defined as 100.4 F for more than an hour please seek medical re-evaluation promptly as infected some good she is sick very quickly.  Provide urology referral in EMR as well.  Will provide pain and nausea medication at this time             Clinical Impression:  Final diagnoses:  [R10.9] Left flank pain  (Primary)  [N20.1] Ureterolithiasis          ED Disposition Condition    Discharge Stable          ED Prescriptions       Medication Sig Dispense Start Date End Date Auth. Provider    ondansetron (ZOFRAN-ODT) 4 MG TbDL Take 1 tablet (4 mg total) by mouth every 6 (six) hours as needed (for nausea). 20 tablet 6/10/2025 -- Ascencion Pete MD    ketorolac (TORADOL) 10 mg tablet Take 1 tablet (10 mg total) by mouth every 6 (six) hours as needed for Pain (Flank stone from kidney stone pain). 20 tablet 6/10/2025 6/15/2025 Ascencion Pete MD          Follow-up Information    None                [1]   Social History  Tobacco Use    Smoking status: Never    Smokeless tobacco: Never   Substance Use Topics    Alcohol use: Never    Drug use: Never        Ascencion Pete MD  06/10/25 3990

## 2025-06-10 NOTE — Clinical Note
"Bill Lynn "Bill Cristobala" Filiberto was seen and treated in our emergency department on 6/10/2025.  She may return to work on 06/12/2025.       If you have any questions or concerns, please don't hesitate to call.      Rolando FRENCH    "

## 2025-06-12 LAB — BACTERIA UR CULT: ABNORMAL
